# Patient Record
Sex: FEMALE | Race: BLACK OR AFRICAN AMERICAN | NOT HISPANIC OR LATINO | URBAN - METROPOLITAN AREA
[De-identification: names, ages, dates, MRNs, and addresses within clinical notes are randomized per-mention and may not be internally consistent; named-entity substitution may affect disease eponyms.]

---

## 2017-08-01 ENCOUNTER — EMERGENCY (EMERGENCY)
Facility: HOSPITAL | Age: 67
LOS: 1 days | Discharge: HOME | End: 2017-08-01
Attending: EMERGENCY MEDICINE | Admitting: EMERGENCY MEDICINE
Payer: COMMERCIAL

## 2017-08-01 VITALS
HEART RATE: 88 BPM | RESPIRATION RATE: 16 BRPM | OXYGEN SATURATION: 99 % | WEIGHT: 130.29 LBS | TEMPERATURE: 99 F | DIASTOLIC BLOOD PRESSURE: 89 MMHG | SYSTOLIC BLOOD PRESSURE: 142 MMHG | HEIGHT: 65 IN

## 2017-08-01 VITALS
DIASTOLIC BLOOD PRESSURE: 81 MMHG | OXYGEN SATURATION: 98 % | HEART RATE: 67 BPM | SYSTOLIC BLOOD PRESSURE: 148 MMHG | RESPIRATION RATE: 17 BRPM | TEMPERATURE: 99 F

## 2017-08-01 DIAGNOSIS — R60.0 LOCALIZED EDEMA: ICD-10-CM

## 2017-08-01 DIAGNOSIS — I10 ESSENTIAL (PRIMARY) HYPERTENSION: ICD-10-CM

## 2017-08-01 PROCEDURE — 71020: CPT | Mod: 26

## 2017-08-01 PROCEDURE — 93005 ELECTROCARDIOGRAM TRACING: CPT

## 2017-08-01 PROCEDURE — 80053 COMPREHEN METABOLIC PANEL: CPT

## 2017-08-01 PROCEDURE — 81001 URINALYSIS AUTO W/SCOPE: CPT

## 2017-08-01 PROCEDURE — 99284 EMERGENCY DEPT VISIT MOD MDM: CPT | Mod: 25

## 2017-08-01 PROCEDURE — 93010 ELECTROCARDIOGRAM REPORT: CPT | Mod: NC

## 2017-08-01 PROCEDURE — 71020: CPT | Mod: 26,NC

## 2017-08-01 PROCEDURE — 36415 COLL VENOUS BLD VENIPUNCTURE: CPT

## 2017-08-01 PROCEDURE — 71046 X-RAY EXAM CHEST 2 VIEWS: CPT

## 2017-08-01 PROCEDURE — 85025 COMPLETE CBC W/AUTO DIFF WBC: CPT

## 2017-08-01 RX ORDER — HYDROCHLOROTHIAZIDE 25 MG
0 TABLET ORAL
Qty: 0 | Refills: 0 | COMMUNITY

## 2017-08-01 RX ORDER — ACETAMINOPHEN 500 MG
1000 TABLET ORAL ONCE
Qty: 0 | Refills: 0 | Status: COMPLETED | OUTPATIENT
Start: 2017-08-01 | End: 2017-08-01

## 2017-08-01 RX ADMIN — Medication 1000 MILLIGRAM(S): at 22:28

## 2017-08-01 NOTE — ED PROVIDER NOTE - PROGRESS NOTE DETAILS
Pt is feeling better s/p Tylenol. W/u negative for acute pathology. Return precautions. Salt restriction, DASH diet, BP log, Tylenol prn. F/u PCP

## 2017-08-01 NOTE — ED PROVIDER NOTE - OBJECTIVE STATEMENT
Pt with PMHx HTN on HCTZ 25 QD p/w multiple medical complaints. Pt reports last week she had an episode of facial swelling. Pt brings picture with moderate b/l facial, periorbital and lip edema. Pt reports she had associated facial pruritus and raised red marks on her face. Pt went to see her PCP and was given Bendadryl with relief. Pt reports she feels like she is edematous in general, and has gained weight, and this morning she also noted her b/l feet / ankles were swelling. Pt drove to [facility] from [place] today, stopped and walked around several times. No calf pain, CP, SOB. No hx CHF, PE, DVT, or malignancy. No HENRY, orthopnea, PND. No CP. Pt is also concerned because her BP is "high," stating it is normally 110s-120s SBP, and now it has been 140s. Pt reports intermittent HA and lightheadedness x 2 weeks. Denies vertigo. HA on exam is L retro-orbital, non radiating, 7/10, nor worst of life. Pt has not taken any analgesia. No blurred vision, neck pain, paresthesias, or focal weakness. No head trauma. Pt with PMHx HTN on HCTZ 25 QD p/w multiple medical complaints. Pt reports last week she had an episode of facial swelling. Pt brings picture with moderate b/l facial, periorbital and lip edema. Pt reports she had associated facial pruritus and raised red marks on her face. Pt went to see her PCP and was given Benadryl with relief of sx. Pt denies having new foods, or using new soaps / detergents. Pt reports she feels like she is edematous in general, and has gained weight, and this morning she also noted her b/l feet / ankles were swelling. Pt drove to [facility] from [place] today, stopped and walked around several times. No calf pain, CP, SOB. No hx CHF, PE, DVT, or malignancy. No hemoptysis, exogenous hormone use. No recent immobilization / surgery. No HENRY, orthopnea, PND. No CP. Pt is also concerned because her BP is "high," stating it is normally 110s-120s SBP, and now it has been 140s. Pt reports intermittent HA and lightheadedness x 2 weeks. Denies vertigo. HA on exam is L retro-orbital, non radiating, 7/10, nor worst of life. Pt has not taken any analgesia. No blurred vision, neck pain, paresthesias, or focal weakness. No head trauma.

## 2017-08-01 NOTE — ED PROVIDER NOTE - ENMT, MLM
Airway patent, Nasal mucosa clear. Mouth with normal mucosa. Throat has no vesicles, no oropharyngeal exudates and uvula is midline. no tongue / lip / uvula / pharyngeal / sublingual edema. no oral lesions. no drooling or stridor. no facial swelling

## 2017-08-01 NOTE — ED PROVIDER NOTE - MEDICAL DECISION MAKING DETAILS
Pt p/w multiple medical complaints. 1. Facial edema, resolved after antihistamine use. allergic 2/2 unknown etiology. Benadryl prn. No facial edema/ rash / allergic sx at this time. 2. Peripheral edema of b/l LE, symmetric, mild, non pitting. Likely dependent edema with recent travel, ambulating around city w/ hot weather. Salt restriction. Continue antihypertensive / diuretic. Leg elevation. No si / sx to indicate CHF. No anasarca. 3. Intermittent dizziness and HA, mild HTN. No evidence of Hypertensive Emergency. DDx includes stress, mild elevations BP, nonspecific HA. No neuro complaints or deficits, not worst of life, nor thunderclap, improved w/ Tylenol in ED. Offered CT brain in ED, pt declined. 3. HTN. Continue meds. Monitor BP at home, f/u PCP for medication adjustment

## 2017-08-01 NOTE — ED ADULT TRIAGE NOTE - CHIEF COMPLAINT QUOTE
"I have swelling to my face, legs and plus a headache and dizziness on and off for 2 weeks, I saw my doctor and he told me to take antihistamine which helped with my face and hands but not my legs". Pt denies sob, CHF and states she can lay flat to sleep.

## 2017-08-01 NOTE — ED PROVIDER NOTE - MUSCULOSKELETAL, MLM
Range of motion is not limited. mild, non pitting edema of the feet and ankles. no calf ttp b/l. legs symmetric in size

## 2017-08-01 NOTE — ED ADULT NURSE NOTE - OBJECTIVE STATEMENT
Pt presents to ER with multiple complaints. Pt reports intermittent dizziness x2 wks. Last wk Pt had swelling to face and hands, currently reports bilat ankle swelling and bloating. Pt stated she gained approx 15 lbs within the past 2 wks. Right now reports HA and 3 episodes of diarrhea today. Family at bedside.

## 2017-08-01 NOTE — ED PROVIDER NOTE - DIAGNOSTIC INTERPRETATION
ER Physician: Theresa Cancino, DO  CHEST XRAY INTERPRETATION: lungs clear, heart shadow normal, bony structures intact
